# Patient Record
Sex: MALE | Race: BLACK OR AFRICAN AMERICAN
[De-identification: names, ages, dates, MRNs, and addresses within clinical notes are randomized per-mention and may not be internally consistent; named-entity substitution may affect disease eponyms.]

---

## 2021-01-12 ENCOUNTER — HOSPITAL ENCOUNTER (EMERGENCY)
Dept: HOSPITAL 56 - MW.ED | Age: 46
Discharge: HOME | End: 2021-01-12
Payer: SELF-PAY

## 2021-01-12 DIAGNOSIS — K80.20: Primary | ICD-10-CM

## 2021-01-12 DIAGNOSIS — R79.89: ICD-10-CM

## 2021-01-12 DIAGNOSIS — R16.0: ICD-10-CM

## 2021-01-12 LAB
BUN SERPL-MCNC: 10 MG/DL (ref 7–18)
CHLORIDE SERPL-SCNC: 98 MMOL/L (ref 98–107)
CO2 SERPL-SCNC: 25.7 MMOL/L (ref 21–32)
GLUCOSE SERPL-MCNC: 149 MG/DL (ref 74–106)
LIPASE SERPL-CCNC: 142 U/L (ref 73–393)
POTASSIUM SERPL-SCNC: 3.6 MMOL/L (ref 3.5–5.1)
SODIUM SERPL-SCNC: 134 MMOL/L (ref 136–148)

## 2021-01-12 PROCEDURE — 80074 ACUTE HEPATITIS PANEL: CPT

## 2021-01-12 PROCEDURE — 80053 COMPREHEN METABOLIC PANEL: CPT

## 2021-01-12 PROCEDURE — 76705 ECHO EXAM OF ABDOMEN: CPT

## 2021-01-12 PROCEDURE — 96375 TX/PRO/DX INJ NEW DRUG ADDON: CPT

## 2021-01-12 PROCEDURE — 83690 ASSAY OF LIPASE: CPT

## 2021-01-12 PROCEDURE — 81003 URINALYSIS AUTO W/O SCOPE: CPT

## 2021-01-12 PROCEDURE — 36415 COLL VENOUS BLD VENIPUNCTURE: CPT

## 2021-01-12 PROCEDURE — 96374 THER/PROPH/DIAG INJ IV PUSH: CPT

## 2021-01-12 PROCEDURE — 85025 COMPLETE CBC W/AUTO DIFF WBC: CPT

## 2021-01-12 PROCEDURE — C9113 INJ PANTOPRAZOLE SODIUM, VIA: HCPCS

## 2021-01-12 PROCEDURE — 71045 X-RAY EXAM CHEST 1 VIEW: CPT

## 2021-01-12 PROCEDURE — 99284 EMERGENCY DEPT VISIT MOD MDM: CPT

## 2021-01-12 RX ADMIN — Medication PRN ML: at 19:29

## 2021-01-12 RX ADMIN — SODIUM CHLORIDE, PRESERVATIVE FREE PRN ML: 5 INJECTION INTRAVENOUS at 19:29

## 2021-01-12 RX ADMIN — SODIUM CHLORIDE ONE MLS/HR: 9 INJECTION, SOLUTION INTRAMUSCULAR; INTRAVENOUS; SUBCUTANEOUS at 19:29

## 2021-01-12 RX ADMIN — ONDANSETRON ONE MG: 2 INJECTION, SOLUTION INTRAMUSCULAR; INTRAVENOUS at 19:29

## 2021-01-12 NOTE — EDM.PDOC
ED HPI GENERAL MEDICAL PROBLEM





- General


Chief Complaint: Abdominal Pain


Stated Complaint: SICK


Time Seen by Provider: 01/12/21 18:58





- History of Present Illness


INITIAL COMMENTS - FREE TEXT/NARRATIVE: 





The patient says that he wants to use his friend's  and a  

is fluent in English and [place].  Patient's native language is [place].  The 

patient reports he has right upper quadrant pain.





The patient has a severe sharp pain in the right upper quadrant for 2 weeks.  He

vomited at the onset.  He has no appetite and feels weak and lightheaded since. 

The pain got a lot worse today.  Its severe and episodic with one severe episode

starting today.  The patient has no fever chills or cough.  He has no known 

exposure to COVID-19.





The patient enjoys good health and takes no medicine.  He does not smoke and he 

does not drink.  He has had no surgery.  No prior issue with his liver or 

gallbladder


  ** Abdominal


Pain Score (Numeric/FACES): 7





- Related Data


                                    Allergies











Allergy/AdvReac Type Severity Reaction Status Date / Time


 


No Known Allergies Allergy   Verified 01/12/21 19:05











Home Meds: 


                                    Home Meds





Acetaminophen/HYDROcodone [Norco 325-7.5 MG] 1 tab PO Q4H PRN #14 tab 01/12/21 

[Rx]











Past Medical History





- Past Health History


Medical/Surgical History: Denies Medical/Surgical History





- Infectious Disease History


Infectious Disease History: Reports: None





Social & Family History





- Tobacco Use


Tobacco Use Status *Q: Never Tobacco User





- Caffeine Use


Caffeine Use: Reports: None





- Recreational Drug Use


Recreational Drug Use: No





ED ROS GENERAL





- Review of Systems


Review Of Systems: Comprehensive ROS is negative, except as noted in HPI.





ED EXAM, GENERAL





- Physical Exam


Exam: See Below


Free Text/Narrative:: 





My physical exam is in the HPI





Course





- Vital Signs


Text/Narrative:: 





2035.  The x-ray showed an elevated right hemidiaphragm in an isolated area that

 raised the issue of possible eventration or rent in the diaphragm.  The patient

 denies any trauma.  Patient also has elevated liver enzymes and hepatitis 

profile was ordered.  The ultrasound is pending.





Within the department.  With a  went instructed him to get some 

further testing and follow-up with the clinic.  He has to make appointment at 

the clinic and will call him for the testing.  Prescription sent in the pharmacy

 at his request.


Last Recorded V/S: 


                                Last Vital Signs











Temp  37.0 C   01/12/21 21:56


 


Pulse  98   01/12/21 21:56


 


Resp  17   01/12/21 21:56


 


BP  143/83 H  01/12/21 21:56


 


Pulse Ox  96   01/12/21 21:56














- Orders/Labs/Meds


Orders: 


                               Active Orders 24 hr











 Category Date Time Status


 


 HEPATITIS PANEL (4) [REF] Stat Lab  01/12/21 21:09 Received


 


 Sodium Chloride 0.9% [Normal Saline] 1,000 ml Med  01/12/21 19:30 Active





 IV ASDIRECTED   


 


 Sodium Chloride 0.9% [Saline Flush] Med  01/12/21 19:21 Active





 10 ml FLUSH ASDIRECTED PRN   


 


 Sodium Chloride 0.9% [Saline Flush] Med  01/12/21 19:21 Active





 2.5 ml FLUSH ASDIRECTED PRN   


 


 Saline Lock Insert [OM.PC] Stat Oth  01/12/21 19:21 Ordered








                                Medication Orders





Sodium Chloride (Normal Saline)  1,000 mls @ 100 mls/hr IV ASDIRECTED LAURA


   Last Admin: 01/12/21 19:32  Dose: 100 mls/hr


   Documented by: KLEIJOC


Sodium Chloride (Saline Flush)  10 ml FLUSH ASDIRECTED PRN


   PRN Reason: Keep Vein Open


   Last Admin: 01/12/21 19:29  Dose: 10 ml


   Documented by: KLEIJOC


Sodium Chloride (Saline Flush)  2.5 ml FLUSH ASDIRECTED PRN


   PRN Reason: Keep Vein Open


   Last Admin: 01/12/21 19:29  Dose: 2.5 ml


   Documented by: JAHAIRA








Labs: 


                                Laboratory Tests











  01/12/21 01/12/21 01/12/21 Range/Units





  19:35 19:35 19:42 


 


WBC  3.70 L    (4.0-11.0)  K/uL


 


RBC  4.41 L    (4.50-5.90)  M/uL


 


Hgb  14.0    (13.0-17.0)  g/dL


 


Hct  40.7    (38.0-50.0)  %


 


MCV  92.3    (80.0-98.0)  fL


 


MCH  31.7    (27.0-32.0)  pg


 


MCHC  34.4    (31.0-37.0)  g/dL


 


RDW Std Deviation  41.7    (28.0-62.0)  fl


 


RDW Coeff of Luis  12    (11.0-15.0)  %


 


Plt Count  119 L    (150-400)  K/uL


 


MPV  11.50    (7.40-12.00)  fL


 


Neut % (Auto)  56.2    (48.0-80.0)  %


 


Lymph % (Auto)  27.0    (16.0-40.0)  %


 


Mono % (Auto)  13.0    (0.0-15.0)  %


 


Eos % (Auto)  3.5    (0.0-7.0)  %


 


Baso % (Auto)  0.3    (0.0-1.5)  %


 


Neut # (Auto)  2.1    (1.4-5.7)  K/uL


 


Lymph # (Auto)  1.0    (0.6-2.4)  K/uL


 


Mono # (Auto)  0.5    (0.0-0.8)  K/uL


 


Eos # (Auto)  0.1    (0.0-0.7)  K/uL


 


Baso # (Auto)  0.0    (0.0-0.1)  K/uL


 


Nucleated RBC %  0.0    /100WBC


 


Nucleated RBCs #  0    K/uL


 


Sodium   134 L   (136-148)  mmol/L


 


Potassium   3.6   (3.5-5.1)  mmol/L


 


Chloride   98   ()  mmol/L


 


Carbon Dioxide   25.7   (21.0-32.0)  mmol/L


 


BUN   10   (7.0-18.0)  mg/dL


 


Creatinine   1.0   (0.8-1.3)  mg/dL


 


Est Cr Clr Drug Dosing   TNP   


 


Estimated GFR (MDRD)   > 60.0   ml/min


 


Glucose   149 H   ()  mg/dL


 


Calcium   9.2   (8.5-10.1)  mg/dL


 


Total Bilirubin   0.6   (0.2-1.0)  mg/dL


 


AST   356 H   (15-37)  IU/L


 


ALT   259 H   (14-63)  IU/L


 


Alkaline Phosphatase   149 H   ()  U/L


 


Total Protein   8.8 H   (6.4-8.2)  g/dL


 


Albumin   2.6 L   (3.4-5.0)  g/dL


 


Globulin   6.2 H   (2.6-4.0)  g/dL


 


Albumin/Globulin Ratio   0.4 L   (0.9-1.6)  


 


Lipase   142   ()  U/L


 


Urine Color    YELLOW  


 


Urine Appearance    CLEAR  


 


Urine pH    6.5  (5.0-8.0)  


 


Ur Specific Gravity    1.015  (1.001-1.035)  


 


Urine Protein    NEGATIVE  (NEGATIVE)  mg/dL


 


Urine Glucose (UA)    NEGATIVE  (NEGATIVE)  mg/dL


 


Urine Ketones    NEGATIVE  (NEGATIVE)  mg/dL


 


Urine Occult Blood    NEGATIVE  (NEGATIVE)  


 


Urine Nitrite    NEGATIVE  (NEGATIVE)  


 


Urine Bilirubin    NEGATIVE  (NEGATIVE)  


 


Urine Urobilinogen    1.0  (<2.0)  EU/dL


 


Ur Leukocyte Esterase    NEGATIVE  (NEGATIVE)  











Meds: 


Medications











Generic Name Dose Route Start Last Admin





  Trade Name Freq  PRN Reason Stop Dose Admin


 


Sodium Chloride  1,000 mls @ 100 mls/hr  01/12/21 19:30  01/12/21 19:32





  Normal Saline  IV   100 mls/hr





  ASDIRECTED LAURA   Administration


 


Sodium Chloride  10 ml  01/12/21 19:21  01/12/21 19:29





  Saline Flush  FLUSH   10 ml





  ASDIRECTED PRN   Administration





  Keep Vein Open  


 


Sodium Chloride  2.5 ml  01/12/21 19:21  01/12/21 19:29





  Saline Flush  FLUSH   2.5 ml





  ASDIRECTED PRN   Administration





  Keep Vein Open  














Discontinued Medications














Generic Name Dose Route Start Last Admin





  Trade Name Freq  PRN Reason Stop Dose Admin


 


Pantoprazole Sodium 40 mg/  10 mls @ 300 mls/hr  01/12/21 19:21  01/12/21 19:29





  Sodium Chloride  IV  01/12/21 19:22  300 mls/hr





  NOW ONE   Administration


 


Ondansetron HCl  4 mg  01/12/21 19:21  01/12/21 19:29





  Zofran  IVPUSH  01/12/21 19:22  4 mg





  ONETIME ONE   Administration














Departure





- Departure


Time of Disposition: 21:58


Disposition: Home, Self-Care 01


Condition: Good


Clinical Impression: 


 Liver mass, Gallstones, LFT elevation








- Discharge Information


Prescriptions: 


Acetaminophen/HYDROcodone [Norco 325-7.5 MG] 1 tab PO Q4H PRN #14 tab


 PRN Reason: Pain (Moderate 4-6)


Instructions:  Cholelithiasis, Hepatomegaly, Easy-to-Read


Referrals: 


PCP,None [Primary Care Provider] - 


Forms:  ED Department Discharge


Additional Instructions: 


You need a hepatic MRI and a HIDA scan.  The hospital call and try to arrange 

these.  You should make an appointment with the clinic so that you can have that

appointment once these results are back and they can advise you further.  There 

is something wrong with your liver and we are not sure exactly the extent of it 

or the treatment needed until we have the other tests.  If fever or severe pain 

and vomiting again you may return.





[facility] - Internal Medicine


[facility] [place] [address]


Phone: [phone]


Fax: [phone]





The following information is given to patients seen in the emergency department 

who are being discharged to home. This information is to outline your options 

for follow-up care. We provide all patients seen in our emergency department 

with a follow-up referral.





The need for follow-up, as well as the timing and circumstances, are variable 

depending upon the specifics of your emergency department visit.





If you don't have a primary care physician on staff, we will provide you with a 

referral. We always advise you to contact your personal physician following an 

emergency department visit to inform them of the circumstance of the visit and 

for follow-up with them and/or the need for any referrals to a consulting 

specialist.





The emergency department will also refer you to a specialist when appropriate. 

This referral assures that you have the opportunity for follow-up care with a 

specialist. All of these measure are taken in an effort to provide you with 

optimal care, which includes your follow-up.





Under all circumstances we always encourage you to contact your private 

physician who remains a resource for coordinating your care. When calling for 

follow-up care, please make the office aware that this follow-up is from your 

recent emergency room visit. If for any reason you are refused follow-up, please

contact the [facility] Emergency Department

at [phone] and asked to speak to the emergency department charge nurse.








Sepsis Event Note (ED)





- Evaluation


Sepsis Screening Result: No Definite Risk





- Focused Exam


Vital Signs: 


                                   Vital Signs











  Temp Pulse Resp BP Pulse Ox


 


 01/12/21 21:56  37.0 C  98  17  143/83 H  96


 


 01/12/21 21:05   96  18  152/80 H  98


 


 01/12/21 19:05  37.0 C  104 H  17  158/97 H  98














- My Orders


Last 24 Hours: 


My Active Orders





01/12/21 19:21


Sodium Chloride 0.9% [Saline Flush]   10 ml FLUSH ASDIRECTED PRN 


Sodium Chloride 0.9% [Saline Flush]   2.5 ml FLUSH ASDIRECTED PRN 


Saline Lock Insert [OM.PC] Stat 





01/12/21 19:30


Sodium Chloride 0.9% [Normal Saline] 1,000 ml IV ASDIRECTED 





01/12/21 21:09


HEPATITIS PANEL (4) [REF] Stat 














- Assessment/Plan


Last 24 Hours: 


My Active Orders





01/12/21 19:21


Sodium Chloride 0.9% [Saline Flush]   10 ml FLUSH ASDIRECTED PRN 


Sodium Chloride 0.9% [Saline Flush]   2.5 ml FLUSH ASDIRECTED PRN 


Saline Lock Insert [OM.PC] Stat 





01/12/21 19:30


Sodium Chloride 0.9% [Normal Saline] 1,000 ml IV ASDIRECTED 





01/12/21 21:09


HEPATITIS PANEL (4) [REF] Stat

## 2021-01-12 NOTE — US
INDICATION:



Right upper quadrant pain



TECHNIQUE:



Ultrasound abdomen limited.  Sonographic images of the right upper quadrant 

were obtained using gray-scale and color Doppler images.



COMPARISON:



None 



FINDINGS:



Liver: Normal in size. Heterogeneous echotexture. There are two nonvascular 

masses in the liver. The 1st is located adjacent to the diaphragm measures 

2.3 cm in diameter. The 2nd is in the caudal aspect of the liver and 

measures 1.8 cm. No intrahepatic biliary dilatation.  



Gallbladder: Cholelithiasis. Normal wall thickness.  No pericholecystic 

fluid. Sonographic Ocampo sign is positive. 



Common bile duct: 4 mm. 



Pancreas: Normal.  



Right kidney: 9.9 cm in length. Normal echotexture and cortex.  No masses, 

stones, or hydronephrosis.  



  



IMPRESSION:



Cholelithiasis with positive sonographic Ocampo sign. Normal gallbladder 

wall thickness. 



Two indeterminate hepatic lesions. Recommend nonemergent hepatic MRI for 

further evaluation.



Dictated by Sarah Elizalde MD @ Jan 12 2021  9:23PM



Signed by Dr. Sarah Elizalde @ Jan 12 2021  9:35PM

## 2021-01-12 NOTE — CR
INDICATION:



Right-sided pain



COMPARISON:



None



TECHNIQUE:



PA and lateral views of the chest were acquired



FINDINGS:



TUBES AND LINES: None.



HEART AND MEDIASTINUM: The heart size is normal. The mediastinal contour 

appears normal for patient age.



LUNGS AND PLEURAL SPACES: The lungs appear normal.The pleural spaces are 

unremarkable.



OSSEOUS STRUCTURES: Age-appropriate appearance. No acute focal finding.



HEMIDIAPHRAGM: Unusual peak shaped elevation of the right hemidiaphragm. 

The etiology of this is uncertain. This is probably due to an eventration 

or perhaps a pauline herniation. In the absence of additional findings such 

as in this case, this appearance is unlikely to be acute. However, if this 

requires further imaging, a CT should be considered. 



IMPRESSION:



Abnormal appearing right diaphragm as above



Dictated by Brandon Funez MD @ Jan 12 2021  7:48PM



Signed by Dr. Brandon Funez @ Jan 12 2021  7:51PM